# Patient Record
Sex: FEMALE | Race: BLACK OR AFRICAN AMERICAN | NOT HISPANIC OR LATINO | Employment: UNEMPLOYED | URBAN - METROPOLITAN AREA
[De-identification: names, ages, dates, MRNs, and addresses within clinical notes are randomized per-mention and may not be internally consistent; named-entity substitution may affect disease eponyms.]

---
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
## 2022-12-11 ENCOUNTER — HOSPITAL ENCOUNTER (EMERGENCY)
Facility: HOSPITAL | Age: 22
Discharge: HOME | End: 2022-12-11
Attending: EMERGENCY MEDICINE
Payer: MEDICAID

## 2022-12-11 VITALS
RESPIRATION RATE: 16 BRPM | WEIGHT: 178 LBS | HEIGHT: 64 IN | HEART RATE: 97 BPM | SYSTOLIC BLOOD PRESSURE: 125 MMHG | TEMPERATURE: 98 F | OXYGEN SATURATION: 99 % | DIASTOLIC BLOOD PRESSURE: 94 MMHG | BODY MASS INDEX: 30.39 KG/M2

## 2022-12-11 DIAGNOSIS — K04.7 DENTAL ABSCESS: Primary | ICD-10-CM

## 2022-12-11 PROBLEM — E55.9 VITAMIN D DEFICIENCY: Status: ACTIVE | Noted: 2022-05-12

## 2022-12-11 LAB
B-HCG UR QL: NEGATIVE
CTP QC/QA: YES

## 2022-12-11 PROCEDURE — 99284 EMERGENCY DEPT VISIT MOD MDM: CPT | Mod: 25,ER

## 2022-12-11 PROCEDURE — 81025 URINE PREGNANCY TEST: CPT | Mod: ER | Performed by: PHYSICIAN ASSISTANT

## 2022-12-11 PROCEDURE — 25000003 PHARM REV CODE 250: Mod: ER | Performed by: PHYSICIAN ASSISTANT

## 2022-12-11 PROCEDURE — 41800 DRAINAGE OF GUM LESION: CPT | Mod: ER

## 2022-12-11 RX ORDER — CHLORHEXIDINE GLUCONATE ORAL RINSE 1.2 MG/ML
15 SOLUTION DENTAL 2 TIMES DAILY
Qty: 118 ML | Refills: 0 | Status: SHIPPED | OUTPATIENT
Start: 2022-12-11 | End: 2022-12-25

## 2022-12-11 RX ORDER — LIDOCAINE HYDROCHLORIDE 10 MG/ML
10 INJECTION INFILTRATION; PERINEURAL
Status: COMPLETED | OUTPATIENT
Start: 2022-12-11 | End: 2022-12-11

## 2022-12-11 RX ORDER — IBUPROFEN 600 MG/1
600 TABLET ORAL
Status: COMPLETED | OUTPATIENT
Start: 2022-12-11 | End: 2022-12-11

## 2022-12-11 RX ORDER — CLINDAMYCIN HYDROCHLORIDE 150 MG/1
300 CAPSULE ORAL 4 TIMES DAILY
Qty: 40 CAPSULE | Refills: 0 | Status: SHIPPED | OUTPATIENT
Start: 2022-12-11 | End: 2022-12-16

## 2022-12-11 RX ORDER — CLINDAMYCIN HYDROCHLORIDE 150 MG/1
300 CAPSULE ORAL
Status: COMPLETED | OUTPATIENT
Start: 2022-12-11 | End: 2022-12-11

## 2022-12-11 RX ADMIN — IBUPROFEN 600 MG: 600 TABLET ORAL at 11:12

## 2022-12-11 RX ADMIN — LIDOCAINE HYDROCHLORIDE 10 ML: 10 INJECTION, SOLUTION INFILTRATION; PERINEURAL at 11:12

## 2022-12-11 RX ADMIN — CLINDAMYCIN HYDROCHLORIDE 300 MG: 150 CAPSULE ORAL at 11:12

## 2022-12-11 NOTE — ED PROVIDER NOTES
"Encounter Date: 12/11/2022    SCRIBE #1 NOTE: I, Kelly Ortega, am scribing for, and in the presence of,  Josue Rivera PA-C. I have scribed the following portions of the note - Other sections scribed: HPI, ROS.     History     Chief Complaint   Patient presents with    Dental Pain     PT STATES SHE WAS SEEN AT  FOR TOOTH AND GIVEN ABX PT STATES "MY TOOTH HURT SO BAD I CANNOT BREATHE" PT DOES NOT APPEAR TO B IN ANY DISTRESS.      Juan Carlos Mckenzie is a 22 y.o. female, with no pertinent PMHx, who presents to the ED with dental pain and swelling onset 4 days ago on right mandibular side. Patient states that she chipped her tooth which resulted in her dental pain. Patient reports going to [facility] ED 2 days ago for the same problem and receiving pain medication with antibiotics.  Patient states that the prescribed medications did not alleviate her symptoms. Denies SOB. Patient notes that she has a upcoming dentist appointment on Tuesday. Denies vomiting, fever or other associated symptoms.       The history is provided by the patient. No  was used.   Review of patient's allergies indicates:  No Known Allergies  History reviewed. No pertinent past medical history.  History reviewed. No pertinent surgical history.  No family history on file.  Social History     Tobacco Use    Smoking status: Never   Substance Use Topics    Drug use: Never     Review of Systems   Constitutional:  Negative for fever.   HENT:  Positive for dental problem (dental pain on right mandibular side) and facial swelling. Negative for sore throat.    Respiratory:  Negative for shortness of breath.    Cardiovascular:  Negative for chest pain.   Gastrointestinal:  Negative for nausea and vomiting.   Genitourinary:  Negative for dysuria.   Musculoskeletal:  Negative for back pain.   Skin:  Negative for rash.   Neurological:  Negative for weakness.   Hematological:  Does not bruise/bleed easily. "   Psychiatric/Behavioral:  Negative for confusion.    All other systems reviewed and are negative.    Physical Exam     Initial Vitals [12/11/22 1035]   BP Pulse Resp Temp SpO2   (!) 129/94 94 18 98.8 °F (37.1 °C) 100 %      MAP       --         Physical Exam    Nursing note and vitals reviewed.  Constitutional: She appears well-developed and well-nourished. She is not diaphoretic. No distress.   HENT:   Head: Atraumatic.   Right Ear: External ear normal.   Left Ear: External ear normal.   Abscess to the right mandibular premolar along gumline.  No active drainage.  Mild facial swelling.  No significant erythema of the gumline.  Oropharynx normal.  No trismus or drooling.  No changes in phonation.     Eyes: Conjunctivae and EOM are normal.   Neck: No tracheal deviation present.   Normal range of motion.  Cardiovascular:  Normal rate and regular rhythm.           Pulmonary/Chest: No accessory muscle usage or stridor. No tachypnea. No respiratory distress.   Musculoskeletal:         General: No edema. Normal range of motion.      Cervical back: Normal range of motion.     Neurological: She is alert and oriented to person, place, and time. She displays no tremor. She displays no seizure activity. Coordination and gait normal.   Skin: Skin is intact. Capillary refill takes less than 2 seconds. No rash noted.       ED Course   I & D - Incision and Drainage    Date/Time: 12/11/2022 1:46 PM  Location procedure was performed: [facility] EMERGENCY DEPARTMENT  Performed by: Josue Rivera PA-C  Authorized by: Nanci Ha MD   Consent Done: Yes  Consent: Verbal consent obtained.  Consent given by: patient  Type: abscess  Location: R mandibular premolar.    Anesthesia:  Local Anesthetic: lidocaine 1% without epinephrine  Comments: UNABLE TO TOLERATE. DECLINES FURTHER ATTEMPTS AT I&D.       Labs Reviewed   POCT URINE PREGNANCY          Imaging Results    None          Medications   LIDOcaine HCL 10 mg/ml (1%) injection 10 mL  (10 mLs Infiltration Given by Provider 12/11/22 1126)   ibuprofen tablet 600 mg (600 mg Oral Given 12/11/22 1125)   clindamycin capsule 300 mg (300 mg Oral Given 12/11/22 1124)     Medical Decision Making:   History:   Old Medical Records: I decided to obtain old medical records.  Clinical Tests:   Lab Tests: Reviewed and Ordered  ED Management:  Dental abscess.  Attempted to drain the ED, but upon injecting lidocaine, patient becomes very anxious and declines further attempts at drainage in the ED. I recommend drainage in the ED is I do not feel antibiotics alone will improve her symptoms but she continues to decline.  She does have dental follow-up in 2 days.  No systemic symptoms.  No deep space infection or peritonsillar abscess.  Already on unknown antibiotics; will offer clindamycin to ensure appropriate antimicrobial coverage.  She is breast-feeding but states she has plenty of breast milk reserves and also uses formula and feels comfortable being on clindamycin without breast feeding until she can see dentist in 2 days.        Scribe Attestation:   Scribe #1: I performed the above scribed service and the documentation accurately describes the services I performed. I attest to the accuracy of the note.                   Clinical Impression:   Final diagnoses:  [K04.7] Dental abscess (Primary)     I, Josue Rivera PA-C, personally performed the services described in this documentation.  All medical record entries made by the scribe were at my direction and in my presence.  I have reviewed the chart and agree that the record reflects my personal performance and is accurate and complete.     ED Disposition Condition    Discharge Stable          ED Prescriptions       Medication Sig Dispense Start Date End Date Auth. Provider    chlorhexidine (PERIDEX) 0.12 % solution Use as directed 15 mLs in the mouth or throat 2 (two) times daily. for 14 days 118 mL 12/11/2022 12/25/2022 Josue Rivera PA-C    clindamycin  (CLEOCIN) 150 MG capsule Take 2 capsules (300 mg total) by mouth 4 (four) times daily. for 5 days 40 capsule 12/11/2022 12/16/2022 Josue Rivera PA-C          Follow-up Information       Follow up With Specialties Details Why Contact Info    go to your scheduled dental appt this tuesday for definitive management        [facility] Surgical Oncology, Orthopedic Surgery, Genetics, Physical Medicine and Rehabilitation, Occupational Therapy, Radiology Go in 1 day as an alternative to specialist evaluation [address] [place] [id number]  [phone]      [facility] ED Emergency Medicine Go to  If symptoms worsen [address] [facility] [id number] [phone]             Josue Rivera PA-C  12/11/22 5297

## 2022-12-11 NOTE — ED NOTES
Pt reports she was seen at  for dental pain 3 days ago and Rx PCN. Has been compliant with antibiotic and reports worsening localized edema and pain to right face. Has localized to right lower face and edema to right lateral lower lip.No tongue edema. Reports SOB. Denies wheezing. Denies fever. Pt is breastfeeding.

## 2022-12-11 NOTE — DISCHARGE INSTRUCTIONS

## 2023-01-20 ENCOUNTER — HOSPITAL ENCOUNTER (EMERGENCY)
Facility: HOSPITAL | Age: 23
Discharge: HOME | End: 2023-01-21
Attending: EMERGENCY MEDICINE
Payer: MEDICAID

## 2023-01-20 DIAGNOSIS — M25.519 SHOULDER PAIN: ICD-10-CM
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
## 2023-01-20 DIAGNOSIS — M79.605 LEG PAIN, LEFT: ICD-10-CM

## 2023-01-20 PROCEDURE — 99283 PR EMERGENCY DEPT VISIT,LEVEL III: ICD-10-PCS | Mod: 25,,, | Performed by: EMERGENCY MEDICINE

## 2023-01-20 PROCEDURE — 12011 PR RESUPERF WND FACE <2.5 CM: ICD-10-PCS | Mod: ,,, | Performed by: EMERGENCY MEDICINE

## 2023-01-20 PROCEDURE — 99283 EMERGENCY DEPT VISIT LOW MDM: CPT | Mod: 25,,, | Performed by: EMERGENCY MEDICINE

## 2023-01-20 PROCEDURE — 99284 EMERGENCY DEPT VISIT MOD MDM: CPT | Mod: 25

## 2023-01-20 PROCEDURE — 12011 RPR F/E/E/N/L/M 2.5 CM/<: CPT | Mod: ,,, | Performed by: EMERGENCY MEDICINE

## 2023-01-20 PROCEDURE — 12011 RPR F/E/E/N/L/M 2.5 CM/<: CPT

## 2023-01-21 VITALS
HEART RATE: 93 BPM | SYSTOLIC BLOOD PRESSURE: 142 MMHG | TEMPERATURE: 99 F | OXYGEN SATURATION: 99 % | DIASTOLIC BLOOD PRESSURE: 84 MMHG | RESPIRATION RATE: 20 BRPM

## 2023-01-21 LAB
B-HCG UR QL: NEGATIVE
CTP QC/QA: YES

## 2023-01-21 PROCEDURE — 25000003 PHARM REV CODE 250

## 2023-01-21 PROCEDURE — 81025 URINE PREGNANCY TEST: CPT

## 2023-01-21 RX ORDER — ACETAMINOPHEN 500 MG
1000 TABLET ORAL
Status: COMPLETED | OUTPATIENT
Start: 2023-01-21 | End: 2023-01-21

## 2023-01-21 RX ADMIN — ACETAMINOPHEN 1000 MG: 500 TABLET ORAL at 12:01
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
## 2024-06-18 ENCOUNTER — HOSPITAL ENCOUNTER (EMERGENCY)
Facility: HOSPITAL | Age: 24
Discharge: HOME | End: 2024-06-18
Attending: EMERGENCY MEDICINE

## 2024-06-18 VITALS
DIASTOLIC BLOOD PRESSURE: 73 MMHG | BODY MASS INDEX: 29.87 KG/M2 | HEART RATE: 83 BPM | TEMPERATURE: 98 F | WEIGHT: 174 LBS | RESPIRATION RATE: 20 BRPM | OXYGEN SATURATION: 98 % | SYSTOLIC BLOOD PRESSURE: 151 MMHG

## 2024-06-18 DIAGNOSIS — K04.7 DENTAL ABSCESS: ICD-10-CM

## 2024-06-18 DIAGNOSIS — K02.9 DENTAL CARIES: ICD-10-CM

## 2024-06-18 DIAGNOSIS — K08.89 PAIN, DENTAL: Primary | ICD-10-CM

## 2024-06-18 DIAGNOSIS — R22.0 FACIAL SWELLING: ICD-10-CM

## 2024-06-18 LAB
B-HCG UR QL: NEGATIVE
CTP QC/QA: YES

## 2024-06-18 PROCEDURE — 81025 URINE PREGNANCY TEST: CPT | Mod: ER

## 2024-06-18 PROCEDURE — 99284 EMERGENCY DEPT VISIT MOD MDM: CPT | Mod: 25,ER

## 2024-06-18 RX ORDER — CHLORHEXIDINE GLUCONATE ORAL RINSE 1.2 MG/ML
15 SOLUTION DENTAL 2 TIMES DAILY
Qty: 420 ML | Refills: 0 | Status: SHIPPED | OUTPATIENT
Start: 2024-06-18 | End: 2024-07-02

## 2024-06-18 RX ORDER — HYDROCODONE BITARTRATE AND ACETAMINOPHEN 5; 325 MG/1; MG/1
1 TABLET ORAL EVERY 6 HOURS PRN
Qty: 12 TABLET | Refills: 0 | Status: SHIPPED | OUTPATIENT
Start: 2024-06-18

## 2024-06-18 RX ORDER — AMOXICILLIN AND CLAVULANATE POTASSIUM 875; 125 MG/1; MG/1
1 TABLET, FILM COATED ORAL 2 TIMES DAILY
Qty: 28 TABLET | Refills: 0 | Status: SHIPPED | OUTPATIENT
Start: 2024-06-18 | End: 2024-07-02

## 2024-06-18 NOTE — DISCHARGE INSTRUCTIONS
Please follow up with a dentist as soon as possible for reevaluation of symptoms, if you do not have one you can follow up with one of the dental clinics from the list that will given to you with your discharge instructions.    Please take all your antibiotics as prescribed even if your are feeling better or your symptoms resolve.    Please return to the ER for any worsening symptoms including: fever, facial swelling/redness, difficulty opening your mouth/breathing/swallowing or new symptoms or other concerns.    Local Dental Clinics. Please call or visit the Medicaid Dental Website Below:   [facility]:  [address], [address]; [place] [address]  [phone] - Accepts Medicaid    [facility]  [address] [place] [address] [phone] - Accepts Medicaid    [facility] -  [address], [address], [place] [address]  [phone]    [facility]:  Monday - Friday 8:00 AM - 4:30PM  [address] [place] [address] [phone]    Patients with Medicaid Dental Coverage:  You can go to [email or web address]  To find a list of dentists in the area that accept Medicaid. Please call the numbers to schedule an appointment.   Thank you for coming to our Emergency Department today. It is important to remember that some problems or medical conditions are difficult to diagnose and may not be found or addressed during your Emergency Department visit.  These conditions often start with non-specific symptoms and can only be diagnosed on follow up visits with your primary care physician or specialist when the symptoms continue or change. Please remember that all medical conditions can change, and we cannot predict how you will be feeling tomorrow or the next day. Return to the ER with any questions/concerns, new/concerning symptoms including fever, chest pain, shortness of breath, loss of consciousness, dizziness, weakness, worsening  symptoms, failure to improve, or any other concerns. Also, please follow up with your Primary Care Physician and/or Pediatrician in the next 1-2 days to review your ED visit in entirety and for re-evaluation.   Be sure to follow up with your primary care doctor and review all labs/imaging/tests that were performed during your ER visit with them. It is very common for us to identify non-emergent incidental findings which must be followed up with your primary care physician.  Some labs/imaging/tests may be outside of the normal range, and require non-emergent follow-up and/or further investigation/treatment/procedures/testing to help diagnose/exclude/prevent complications or other potentially serious medical conditions. Some abnormalities may not have been discussed or addressed during your ER visit. Some lab results may not return during your ER visit but can be accessible by downloading the free [facility] My[facility] ghada or by visiting [email or web address] . It is important for you to review all labs/imaging/tests which are outside of the normal range with your physician.  An ER visit does not replace a primary care visit, and many screening tests or follow-up tests cannot be ordered by an ER doctor or performed by the ER. Some tests may even require pre-approval.  If you do not have a primary care doctor, you may contact the one listed on your discharge paperwork or you may also call the [facility] Appointment Desk at [phone] , or [facility] at  [phone] to schedule an appointment, or establish care with a primary care doctor or even a specialist and to obtain information about local resources. It is important to your health that you have a primary care doctor.  Please take all medications as directed. We have done our best to select a medication for you that will treat your condition however, all medications may potentially have side-effects and it is impossible to predict which medications may give  you side-effects or what those side-effects (if any) those medications may give you.  If you feel that you are having a negative effect or side-effect of any medication you should stop taking those medications immediately and seek medical attention. If you feel that you are having a life-threatening reaction call 911.  Do not drive, swim, climb to height, take a bath, operate heavy machinery, drink alcohol or take potentially sedating medications, sign any legal documents or make any important decisions for 24 hours if you have received any pain medications, sedatives or mood altering drugs during your ER visit or within 24 hours of taking them if they have been prescribed to you.   You can find additional resources for Dentists, hearing aids, durable medical equipment, low cost pharmacies and other resources at [email or web address]  Patient agrees with this plan. Discussed with her strict return precautions, they verbalized understanding. Patient is stable for discharge.   § Please take all medication as prescribed.

## 2024-06-18 NOTE — ED PROVIDER NOTES
Encounter Date: 6/18/2024       History     Chief Complaint   Patient presents with    Dental Pain    Facial Swelling     Right sided facial swelling starting a few days ago   Pt needs root canal        The history is provided by the patient and medical records.   23-year-old female no pertinent past medical history presenting to the emergency department today complaining of right lower dental pain and facial swelling x1 day.  Patient states she has been having dental pain over the weekend and that the dental pain and started to subside although she was now having some swelling to the right lower jaw.  Patient states she was supposed to see the dentist today but could not afford it.  States that she will see the dentist in a week's time for tooth extraction.  Denies any medication for his symptoms.  Denies any fever, chest pain, shortness for breath, dizziness, difficulty swallowing, muffled voice, other associated symptoms.  Review of patient's allergies indicates:   Allergen Reactions    Arthritis strength bc powder [aspirin-salicylamide-caffeine] Edema     Oral edema     History reviewed. No pertinent past medical history.  History reviewed. No pertinent surgical history.  No family history on file.  Social History     Tobacco Use    Smoking status: Never    Smokeless tobacco: Never   Substance Use Topics    Alcohol use: Never    Drug use: Never     Review of Systems   Constitutional:  Negative for chills, diaphoresis, fatigue and fever.   HENT:  Positive for dental problem and facial swelling. Negative for congestion, drooling, rhinorrhea, sore throat, trouble swallowing and voice change.    Eyes:  Negative for redness and visual disturbance.   Respiratory:  Negative for cough and shortness of breath.    Cardiovascular:  Negative for chest pain, palpitations and leg swelling.   Gastrointestinal:  Negative for abdominal pain, diarrhea, nausea and vomiting.   Genitourinary:  Negative for difficulty  urinating, dysuria, flank pain and frequency.   Musculoskeletal:  Negative for arthralgias, back pain, myalgias, neck pain and neck stiffness.   Skin:  Negative for rash.   Neurological:  Negative for dizziness, weakness, light-headedness and headaches.   Hematological:  Does not bruise/bleed easily.       Physical Exam     Initial Vitals [06/18/24 1651]   BP Pulse Resp Temp SpO2   (!) 156/77 86 20 97.9 °F (36.6 °C) 98 %      MAP       --         Physical Exam    Nursing note and vitals reviewed.  Constitutional: She appears well-developed and well-nourished. She is not diaphoretic. No distress.   HENT:   Head: Normocephalic and atraumatic.   Right Ear: External ear normal. No mastoid tenderness. No hemotympanum.   Left Ear: External ear normal. No mastoid tenderness. No hemotympanum.   Nose: Nose normal. Right sinus exhibits no maxillary sinus tenderness and no frontal sinus tenderness. Left sinus exhibits no maxillary sinus tenderness and no frontal sinus tenderness.   Mouth/Throat: Uvula is midline, oropharynx is clear and moist and mucous membranes are normal. She does not have dentures. No oral lesions. No trismus in the jaw. Abnormal dentition. Dental caries present. No dental abscesses, uvula swelling or lacerations.       No oropharyngeal or uvula erythema or swelling.  Uvula is midline.  No trismus.  Normal jaw occlusion.  Dental caries noted.  No obvious periapical periodontal abscess.  Slight reactive facial swelling over area where dental caries with located.  No overlying erythema or facial cellulitis.  No gingival tenderness or friability.  No drainage.  Patient tolerating oral secretions.  Normal voice.  Patient is speaking in full sentences without difficulty.  No submandibular sublingual swelling erythema or tenderness.  Patient was full range of motion of the neck without limitation or pain.  No lymphadenopathy.  Localized swelling no further distribution or evidence of deeper infection.   Eyes:  Conjunctivae and EOM are normal. Pupils are equal, round, and reactive to light. Right eye exhibits no discharge. Left eye exhibits no discharge.   Neck: Trachea normal. Neck supple. No thyroid mass present. No stridor present. No tracheal tenderness present. No tracheal deviation present.   Normal range of motion.   Full passive range of motion without pain.     Cardiovascular:  Normal rate, regular rhythm, normal heart sounds and normal pulses.     Exam reveals no distant heart sounds and no friction rub.       Pulmonary/Chest: Effort normal and breath sounds normal. No respiratory distress.   Abdominal: Abdomen is soft. Bowel sounds are normal. She exhibits no distension, no pulsatile midline mass and no mass. There is no splenomegaly or hepatomegaly. There is no abdominal tenderness.   No right CVA tenderness.  No left CVA tenderness. There is no rebound and no guarding.   Musculoskeletal:         General: Normal range of motion.      Cervical back: Normal, full passive range of motion without pain, normal range of motion and neck supple. No edema, erythema or rigidity. No spinous process tenderness or muscular tenderness. Normal range of motion.      Thoracic back: Normal.      Lumbar back: Normal.      Right lower leg: Normal.      Left lower leg: Normal.     Lymphadenopathy:        Head (right side): No submental, no submandibular, no tonsillar, no preauricular, no posterior auricular and no occipital adenopathy present.        Head (left side): No submental, no submandibular, no tonsillar, no preauricular, no posterior auricular and no occipital adenopathy present.     She has no cervical adenopathy.   Neurological: She is alert and oriented to person, place, and time. She has normal strength. No cranial nerve deficit or sensory deficit. Gait normal.   Skin: Skin is warm and dry. Capillary refill takes less than 2 seconds. No bruising, no ecchymosis and no rash noted. No pallor.   Psychiatric: She has a  normal mood and affect. Her speech is normal and behavior is normal. Thought content normal.       ED Course   Procedures  Labs Reviewed   POCT URINE PREGNANCY          Imaging Results    None          Medications - No data to display  Medical Decision Making  23-year-old female no pertinent past medical history presenting to the emergency department today complaining of right lower dental pain and facial swelling x1 day.  Patient states she has been having dental pain over the weekend and that the dental pain and started to subside although she was now having some swelling to the right lower jaw.  Patient states she was supposed to see the dentist today but could not afford it.  States that she will see the dentist in a week's time for tooth extraction.  Denies any medication for his symptoms.  Denies any fever, chest pain, shortness for breath, dizziness, difficulty swallowing, muffled voice, other associated symptoms.  Patient's chart and medical history reviewed.  Patient's vitals reviewed.  They are afebrile, no respiratory distress, nontoxic-appearing in the ED.  Differential diagnosis considered Gingivitis, Dental abscess, Fractured tooth, Facial cellulitis, Pulpitis, Peritonsillar abscess, Retropharyngeal abscess, Eyad's angina , Dry Socket, Trench mouth.   Dental caries with likely reactive swelling. No evidence of deep space infection.  No submandibular or sublingual involvement.  No overlying facial cellulitis.  We will provide Augmentin for coverage to patient will be re-evaluated by her dentist within the next several days.  Patient will be sent home with Percocet for pain.  Instructed oral hygiene measures we will also provide Peridex.  Return precautions given.  Patient was currently hemodynamically stable.  At this time I'll discharge home to follow up with primary care physician in the next 1-2 days for further evaluation.  If the symptoms continue the pt will need to see dental for further  evaluation.  The patient is comfortable with this plan and comfortable going home at this time. After taking into careful account the historical factors and physical exam findings of the patient's presentation today, in conjunction with the empirical and objective data obtained on ED workup, no acute emergent medical condition has been identified. The patient appears to be low risk for an emergent medical condition and I feel it is safe and appropriate at this time for the patient to be discharged to follow-up as detailed in their discharge instructions for reevaluation and possible continued outpatient workup and management. I have discussed the specifics of the workup with the patient and the patient has verbalized understanding of the details of the workup, the diagnosis, the treatment plan, and the need for outpatient follow-up.  Although the patient has no emergent etiology today this does not preclude the development of an emergent condition so in addition, I have advised the patient that they can return to the ED and/or activate EMS at any time with worsening of their symptoms, change of their symptoms, or with any other medical complaint.  The patient remained comfortable and stable during their visit in the ED.  Discharge and follow-up instructions discussed with the patient who expressed understanding and willingness to comply with my recommendations. I discussed with the patient/family the diagnosis, treatment plan, indications for return to the emergency department, and for expected follow-up. I again reiterated to please follow up with their primary doctor in 1-2 days and return to the ED in any new, worsening, or continued symptoms. The patient/family verbalized an understanding. The patient/family is asked if there are any questions or concerns. We discuss the case, until all issues are addressed to the patient/family's satisfaction. Patient/family understands and is agreeable to the plan.   FANI REED  SANTANA BROWN    DISCLAIMER: This note was prepared with Pzoom voice recognition transcription software. Garbled syntax, mangled pronouns, and other bizarre constructions may be attributed to that software system.          Amount and/or Complexity of Data Reviewed  Labs: ordered.    Risk  Prescription drug management.                                      Clinical Impression:  Final diagnoses:  [K08.89] Pain, dental (Primary)  [K02.9] Dental caries  [R22.0] Facial swelling  [K04.7] Dental abscess          ED Disposition Condition    Discharge Stable          ED Prescriptions       Medication Sig Dispense Start Date End Date Auth. Provider    amoxicillin-clavulanate 875-125mg (AUGMENTIN) 875-125 mg per tablet Take 1 tablet by mouth 2 (two) times daily. for 14 days 28 tablet 6/18/2024 7/2/2024 Otis Brown PA-C    HYDROcodone-acetaminophen (NORCO) 5-325 mg per tablet Take 1 tablet by mouth every 6 (six) hours as needed for Pain. 12 tablet 6/18/2024 -- Otis Brown PA-C    chlorhexidine (PERIDEX) 0.12 % solution Use as directed 15 mLs in the mouth or throat 2 (two) times daily. for 14 days 420 mL 6/18/2024 7/2/2024 Otis Brown PA-C          Follow-up Information       Follow up With Specialties Details Why Contact [facility] -  Schedule an appointment as soon as possible for a visit in 1 day for follow up [address]  [place] [address]  [phone]      Your primary care physician  Schedule an appointment as soon as possible for a visit in 1 day for follow up              Otis Brown PA-C  06/18/24 6937